# Patient Record
Sex: FEMALE | Race: WHITE | Employment: PART TIME | URBAN - NONMETROPOLITAN AREA
[De-identification: names, ages, dates, MRNs, and addresses within clinical notes are randomized per-mention and may not be internally consistent; named-entity substitution may affect disease eponyms.]

---

## 2022-12-11 ENCOUNTER — HOSPITAL ENCOUNTER (EMERGENCY)
Age: 22
Discharge: HOME | End: 2022-12-11
Attending: EMERGENCY MEDICINE
Payer: COMMERCIAL

## 2022-12-11 ENCOUNTER — APPOINTMENT (OUTPATIENT)
Dept: CT IMAGING | Age: 22
End: 2022-12-11
Payer: COMMERCIAL

## 2022-12-11 VITALS
HEART RATE: 71 BPM | OXYGEN SATURATION: 98 % | SYSTOLIC BLOOD PRESSURE: 153 MMHG | RESPIRATION RATE: 18 BRPM | DIASTOLIC BLOOD PRESSURE: 93 MMHG | TEMPERATURE: 98.2 F

## 2022-12-11 DIAGNOSIS — N39.0 ACUTE UTI (URINARY TRACT INFECTION): Primary | ICD-10-CM

## 2022-12-11 LAB
A/G RATIO: 1.2 (ref 1.1–2.2)
ALBUMIN SERPL-MCNC: 3.7 G/DL (ref 3.4–5)
ALP BLD-CCNC: 54 U/L (ref 40–129)
ALT SERPL-CCNC: 6 U/L (ref 10–40)
ANION GAP SERPL CALCULATED.3IONS-SCNC: 10 MMOL/L (ref 3–16)
AST SERPL-CCNC: 14 U/L (ref 15–37)
BACTERIA: ABNORMAL /HPF
BASOPHILS ABSOLUTE: 0.1 K/UL (ref 0–0.2)
BASOPHILS RELATIVE PERCENT: 0.6 %
BILIRUB SERPL-MCNC: <0.2 MG/DL (ref 0–1)
BILIRUBIN URINE: NEGATIVE
BLOOD, URINE: ABNORMAL
BUN BLDV-MCNC: 15 MG/DL (ref 7–20)
CALCIUM SERPL-MCNC: 9.3 MG/DL (ref 8.3–10.6)
CHLORIDE BLD-SCNC: 106 MMOL/L (ref 99–110)
CLARITY: ABNORMAL
CO2: 25 MMOL/L (ref 21–32)
COLOR: YELLOW
CREAT SERPL-MCNC: 0.7 MG/DL (ref 0.6–1.1)
EOSINOPHILS ABSOLUTE: 0.2 K/UL (ref 0–0.6)
EOSINOPHILS RELATIVE PERCENT: 1.6 %
EPITHELIAL CELLS, UA: ABNORMAL /HPF (ref 0–5)
GFR SERPL CREATININE-BSD FRML MDRD: >60 ML/MIN/{1.73_M2}
GLUCOSE BLD-MCNC: 94 MG/DL (ref 70–99)
GLUCOSE URINE: NEGATIVE MG/DL
HCG(URINE) PREGNANCY TEST: NEGATIVE
HCT VFR BLD CALC: 35.7 % (ref 36–48)
HEMOGLOBIN: 12.1 G/DL (ref 12–16)
KETONES, URINE: NEGATIVE MG/DL
LEUKOCYTE ESTERASE, URINE: ABNORMAL
LYMPHOCYTES ABSOLUTE: 2.5 K/UL (ref 1–5.1)
LYMPHOCYTES RELATIVE PERCENT: 20.2 %
MCH RBC QN AUTO: 26.6 PG (ref 26–34)
MCHC RBC AUTO-ENTMCNC: 33.8 G/DL (ref 31–36)
MCV RBC AUTO: 78.8 FL (ref 80–100)
MICROSCOPIC EXAMINATION: YES
MONOCYTES ABSOLUTE: 0.7 K/UL (ref 0–1.3)
MONOCYTES RELATIVE PERCENT: 5.7 %
NEUTROPHILS ABSOLUTE: 8.9 K/UL (ref 1.7–7.7)
NEUTROPHILS RELATIVE PERCENT: 71.9 %
NITRITE, URINE: NEGATIVE
PDW BLD-RTO: 14.3 % (ref 12.4–15.4)
PH UA: 6 (ref 5–8)
PLATELET # BLD: 266 K/UL (ref 135–450)
PMV BLD AUTO: 7.1 FL (ref 5–10.5)
POTASSIUM REFLEX MAGNESIUM: 3.9 MMOL/L (ref 3.5–5.1)
PROTEIN UA: 100 MG/DL
RBC # BLD: 4.53 M/UL (ref 4–5.2)
RBC UA: ABNORMAL /HPF (ref 0–4)
SODIUM BLD-SCNC: 141 MMOL/L (ref 136–145)
SPECIFIC GRAVITY UA: >=1.03 (ref 1–1.03)
TOTAL PROTEIN: 6.9 G/DL (ref 6.4–8.2)
URINE REFLEX TO CULTURE: YES
URINE TYPE: ABNORMAL
UROBILINOGEN, URINE: 0.2 E.U./DL
WBC # BLD: 12.3 K/UL (ref 4–11)
WBC UA: ABNORMAL /HPF (ref 0–5)

## 2022-12-11 PROCEDURE — 36415 COLL VENOUS BLD VENIPUNCTURE: CPT

## 2022-12-11 PROCEDURE — 85025 COMPLETE CBC W/AUTO DIFF WBC: CPT

## 2022-12-11 PROCEDURE — 87186 SC STD MICRODIL/AGAR DIL: CPT

## 2022-12-11 PROCEDURE — 74177 CT ABD & PELVIS W/CONTRAST: CPT

## 2022-12-11 PROCEDURE — 96374 THER/PROPH/DIAG INJ IV PUSH: CPT

## 2022-12-11 PROCEDURE — 6360000004 HC RX CONTRAST MEDICATION: Performed by: EMERGENCY MEDICINE

## 2022-12-11 PROCEDURE — 96375 TX/PRO/DX INJ NEW DRUG ADDON: CPT

## 2022-12-11 PROCEDURE — 80053 COMPREHEN METABOLIC PANEL: CPT

## 2022-12-11 PROCEDURE — 84703 CHORIONIC GONADOTROPIN ASSAY: CPT

## 2022-12-11 PROCEDURE — 87088 URINE BACTERIA CULTURE: CPT

## 2022-12-11 PROCEDURE — 87086 URINE CULTURE/COLONY COUNT: CPT

## 2022-12-11 PROCEDURE — 99285 EMERGENCY DEPT VISIT HI MDM: CPT

## 2022-12-11 PROCEDURE — 81001 URINALYSIS AUTO W/SCOPE: CPT

## 2022-12-11 PROCEDURE — 6360000002 HC RX W HCPCS: Performed by: EMERGENCY MEDICINE

## 2022-12-11 RX ORDER — CEFUROXIME AXETIL 250 MG/1
250 TABLET ORAL 2 TIMES DAILY
Qty: 20 TABLET | Refills: 0 | Status: SHIPPED | OUTPATIENT
Start: 2022-12-11 | End: 2022-12-21

## 2022-12-11 RX ORDER — PROPRANOLOL HCL 60 MG
120 CAPSULE, EXTENDED RELEASE 24HR ORAL DAILY
COMMUNITY
Start: 2022-09-26 | End: 2022-12-25

## 2022-12-11 RX ORDER — NORGESTIMATE AND ETHINYL ESTRADIOL 0.25-0.035
1 KIT ORAL DAILY
COMMUNITY

## 2022-12-11 RX ORDER — BUPROPION HYDROCHLORIDE 150 MG/1
150 TABLET ORAL DAILY
COMMUNITY
Start: 2022-11-12

## 2022-12-11 RX ORDER — KETOROLAC TROMETHAMINE 30 MG/ML
30 INJECTION, SOLUTION INTRAMUSCULAR; INTRAVENOUS ONCE
Status: COMPLETED | OUTPATIENT
Start: 2022-12-11 | End: 2022-12-11

## 2022-12-11 RX ORDER — LEVOTHYROXINE SODIUM 0.07 MG/1
75 TABLET ORAL
COMMUNITY
Start: 2022-11-08

## 2022-12-11 RX ORDER — ONDANSETRON 2 MG/ML
4 INJECTION INTRAMUSCULAR; INTRAVENOUS
Status: DISCONTINUED | OUTPATIENT
Start: 2022-12-11 | End: 2022-12-11 | Stop reason: HOSPADM

## 2022-12-11 RX ORDER — IBUPROFEN 600 MG/1
600 TABLET ORAL EVERY 6 HOURS PRN
COMMUNITY

## 2022-12-11 RX ORDER — SERTRALINE HYDROCHLORIDE 100 MG/1
200 TABLET, FILM COATED ORAL DAILY
COMMUNITY
Start: 2022-09-06

## 2022-12-11 RX ADMIN — IOPAMIDOL 75 ML: 755 INJECTION, SOLUTION INTRAVENOUS at 08:39

## 2022-12-11 RX ADMIN — ONDANSETRON HYDROCHLORIDE 4 MG: 2 INJECTION, SOLUTION INTRAMUSCULAR; INTRAVENOUS at 09:09

## 2022-12-11 RX ADMIN — KETOROLAC TROMETHAMINE 30 MG: 30 INJECTION, SOLUTION INTRAMUSCULAR; INTRAVENOUS at 09:09

## 2022-12-11 ASSESSMENT — PAIN SCALES - GENERAL
PAINLEVEL_OUTOF10: 7
PAINLEVEL_OUTOF10: 2

## 2022-12-11 ASSESSMENT — PAIN DESCRIPTION - LOCATION: LOCATION: BACK

## 2022-12-11 ASSESSMENT — PAIN DESCRIPTION - ORIENTATION: ORIENTATION: LOWER

## 2022-12-11 ASSESSMENT — PAIN - FUNCTIONAL ASSESSMENT: PAIN_FUNCTIONAL_ASSESSMENT: 0-10

## 2022-12-13 LAB
ORGANISM: ABNORMAL
URINE CULTURE, ROUTINE: ABNORMAL
URINE CULTURE, ROUTINE: ABNORMAL

## 2023-02-05 ENCOUNTER — APPOINTMENT (OUTPATIENT)
Dept: GENERAL RADIOLOGY | Age: 23
End: 2023-02-05
Payer: COMMERCIAL

## 2023-02-05 ENCOUNTER — HOSPITAL ENCOUNTER (EMERGENCY)
Age: 23
Discharge: HOME | End: 2023-02-05
Payer: COMMERCIAL

## 2023-02-05 VITALS
RESPIRATION RATE: 18 BRPM | HEART RATE: 79 BPM | TEMPERATURE: 97.8 F | OXYGEN SATURATION: 98 % | WEIGHT: 293 LBS | DIASTOLIC BLOOD PRESSURE: 87 MMHG | SYSTOLIC BLOOD PRESSURE: 144 MMHG

## 2023-02-05 DIAGNOSIS — S93.401A MODERATE RIGHT ANKLE SPRAIN, INITIAL ENCOUNTER: Primary | ICD-10-CM

## 2023-02-05 DIAGNOSIS — M25.571 ACUTE RIGHT ANKLE PAIN: ICD-10-CM

## 2023-02-05 DIAGNOSIS — R03.0 ELEVATED BLOOD PRESSURE READING: ICD-10-CM

## 2023-02-05 PROCEDURE — 73610 X-RAY EXAM OF ANKLE: CPT

## 2023-02-05 PROCEDURE — 99283 EMERGENCY DEPT VISIT LOW MDM: CPT

## 2023-02-05 PROCEDURE — 6370000000 HC RX 637 (ALT 250 FOR IP): Performed by: PHYSICIAN ASSISTANT

## 2023-02-05 RX ORDER — IBUPROFEN 600 MG/1
600 TABLET ORAL ONCE
Status: COMPLETED | OUTPATIENT
Start: 2023-02-05 | End: 2023-02-05

## 2023-02-05 RX ADMIN — IBUPROFEN 600 MG: 600 TABLET, FILM COATED ORAL at 13:24

## 2023-02-05 ASSESSMENT — PAIN SCALES - GENERAL
PAINLEVEL_OUTOF10: 2
PAINLEVEL_OUTOF10: 2

## 2023-02-05 ASSESSMENT — PAIN DESCRIPTION - ORIENTATION
ORIENTATION: RIGHT
ORIENTATION: RIGHT

## 2023-02-05 ASSESSMENT — PAIN DESCRIPTION - DESCRIPTORS
DESCRIPTORS: ACHING
DESCRIPTORS: ACHING

## 2023-02-05 ASSESSMENT — PAIN DESCRIPTION - LOCATION
LOCATION: ANKLE
LOCATION: ANKLE

## 2023-02-05 ASSESSMENT — PAIN - FUNCTIONAL ASSESSMENT: PAIN_FUNCTIONAL_ASSESSMENT: 0-10

## 2023-02-05 NOTE — ED NOTES
--Patient provided with discharge instructions and any prescriptions. --Instructions, dosing, and follow-up appointments reviewed with patient/family. No further questions or needs at this time. --Vital signs and patient stable upon discharge. --Patient ambulatory to lobby with significant other.       Duane Burnett RN  02/05/23 9887

## 2023-02-05 NOTE — ED NOTES
Pt ambulatory with steady gait and without use of assistive devices.       Jeremy Stoll RN  02/05/23 1400

## 2023-02-05 NOTE — ED NOTES
Air cast and  6 in ace wrap applied to pt R ankle. Pt instructed on use and verbalized understanding.       Clay Baez RN  02/05/23 9414

## 2023-02-05 NOTE — Clinical Note
Hannah Elder was seen and treated in our emergency department on 2/5/2023. She may return to work on 02/07/2023. If you have any questions or concerns, please don't hesitate to call.       Crow Sharp PA-C

## 2023-02-05 NOTE — ED PROVIDER NOTES
[facility]        Pt Name: Ryan Ramires  MRN: [id number]  [place] 2000  Date of evaluation: 2/5/2023  Provider: Fanta Brian PA-C  PCP: Richarda Nyhan, MD  Note Started: 1:49 PM EST 2/5/23      YONI. I have evaluated this patient. My supervising physician was available for consultation. CHIEF COMPLAINT       Chief Complaint   Patient presents with    Ankle Pain     States rolled ankle last night, denies any other injuries        HISTORY OF PRESENT ILLNESS: 1 or more Elements     History From: Patient  Limitations to history : None    Ryan Ramires is a 25 y.o. female who presents to the emergency department for evaluation of right ankle pain and injury that occurred yesterday states she was at the movies and twisted her ankle when she was going down steps. Having pain and swelling to the right lateral ankle. States she also bumped hit her right thigh and has a bruise but only her ankle was bothering her. Did not hit her head. No LOC. Nursing Notes were all reviewed and agreed with or any disagreements were addressed in the HPI. REVIEW OF SYSTEMS :      Review of Systems    Positives and Pertinent negatives as per HPI.      SURGICAL HISTORY     Past Surgical History:   Procedure Laterality Date    WISDOM TOOTH EXTRACTION         CURRENTMEDICATIONS       Previous Medications    BUPROPION (WELLBUTRIN XL) 150 MG EXTENDED RELEASE TABLET    Take 150 mg by mouth daily    IBUPROFEN (ADVIL;MOTRIN) 600 MG TABLET    Take 600 mg by mouth every 6 hours as needed    LEVOTHYROXINE (SYNTHROID) 75 MCG TABLET    Take 75 mcg by mouth every morning (before breakfast)    NORGESTIMATE-ETHINYL ESTRADIOL (ORTHO-CYCLEN) 0.25-35 MG-MCG PER TABLET    Take 1 tablet by mouth daily    PROPRANOLOL (INDERAL LA) 60 MG EXTENDED RELEASE CAPSULE    Take 120 mg by mouth daily    SERTRALINE (ZOLOFT) 100 MG TABLET    Take 200 mg by mouth daily ALLERGIES     Patient has no known allergies. FAMILYHISTORY     History reviewed. No pertinent family history. SOCIAL HISTORY       Social History     Tobacco Use    Smoking status: Never    Smokeless tobacco: Never   Substance Use Topics    Alcohol use: Yes     Comment: occasionally    Drug use: Never       SCREENINGS        Adelaida Coma Scale  Eye Opening: Spontaneous  Best Verbal Response: Oriented  Best Motor Response: Obeys commands  Adelaida Coma Scale Score: 15                CIWA Assessment  BP: (!) 144/87  Heart Rate: 79           PHYSICAL EXAM  1 or more Elements     ED Triage Vitals [02/05/23 1308]   BP Temp Temp Source Heart Rate Resp SpO2 Height Weight   (!) 144/87 97.8 °F (36.6 °C) Oral 79 18 98 % -- 300 lb (136.1 kg)       Physical Exam  Vitals and nursing note reviewed. Constitutional:       Appearance: She is well-developed. She is not ill-appearing or toxic-appearing. HENT:      Head: Normocephalic and atraumatic. Cardiovascular:      Pulses: Normal pulses. Dorsalis pedis pulses are 2+ on the right side. Posterior tibial pulses are 2+ on the right side. Pulmonary:      Effort: Pulmonary effort is normal. No respiratory distress. Musculoskeletal:      Comments: Tenderness and moderate swelling to the right lateral ankle. No open wounds. No deformity. No tenderness at her knee or fifth metatarsal.  Intact sensation. Pedal pulses 2+. Cap refill less than 2 seconds. Patient is wiggling her toes and flexing extending her foot. Skin:     General: Skin is warm and dry. Capillary Refill: Capillary refill takes less than 2 seconds. Neurological:      Mental Status: She is alert and oriented to person, place, and time. GCS: GCS eye subscore is 4. GCS verbal subscore is 5. GCS motor subscore is 6. Sensory: Sensation is intact. Motor: Motor function is intact. No abnormal muscle tone.    Psychiatric:         Behavior: Behavior normal. DIAGNOSTIC RESULTS   LABS:    Labs Reviewed - No data to display    When ordered only abnormal lab results are displayed. All other labs were within normal range or not returned as of this dictation. EKG: When ordered, EKG's are interpreted by the Emergency Department Physician in the absence of a cardiologist.  Please see their note for interpretation of EKG. RADIOLOGY:   Non-plain film images such as CT, Ultrasound and MRI are read by the radiologist. Plain radiographic images are visualized and preliminarily interpreted by the ED Provider with the below findings:    Preliminary x-ray interpretation by myself  independently, in absence of radiologist (Final interpretation by radiologist to follow):      Right ankle: Soft tissue swelling noted laterally. No fracture seen. Interpretation per the Radiologist below, if available at the time of this note:    XR ANKLE RIGHT (MIN 3 VIEWS)   Final Result   No acute abnormality of the ankle. XR ANKLE RIGHT (MIN 3 VIEWS)    Result Date: 2/5/2023  EXAMINATION: THREE XRAY VIEWS OF THE RIGHT ANKLE 2/5/2023 1:24 pm COMPARISON: None. HISTORY: ORDERING SYSTEM PROVIDED HISTORY: injury r/o fx TECHNOLOGIST PROVIDED HISTORY: Reason for exam:->injury r/o fx Reason for Exam: Rolled ankle FINDINGS: No evidence of acute fracture or dislocation. Normal alignment of the ankle mortise. No focal osseous lesion. No evidence of joint effusion. No focal soft tissue abnormality. No acute abnormality of the ankle. No results found. PROCEDURES   Unless otherwise noted below, none     Procedures    CRITICAL CARE TIME (.cctime)   0    PAST MEDICAL HISTORY      has a past medical history of Anxiety, Depression, Hypothyroidism, Migraine, and Overactive bladder.      EMERGENCY DEPARTMENT COURSE and DIFFERENTIAL DIAGNOSIS/MDM:   Vitals:    Vitals:    02/05/23 1308   BP: (!) 144/87   Pulse: 79   Resp: 18   Temp: 97.8 °F (36.6 °C)   TempSrc: Oral   SpO2: 98% Weight: 300 lb (136.1 kg)       Patient was given the following medications:  Medications   ibuprofen (ADVIL;MOTRIN) tablet 600 mg (600 mg Oral Given 2/5/23 1324)             Is this patient to be included in the SEP-1 Core Measure due to severe sepsis or septic shock? No   Exclusion criteria - the patient is NOT to be included for SEP-1 Core Measure due to: Infection is not suspected    Chronic Conditions affecting care:    has a past medical history of Anxiety, Depression, Hypothyroidism, Migraine, and Overactive bladder. CONSULTS: (Who and What was discussed)  None          Records Reviewed (External and Source)     CC/HPI Summary, DDx, ED Course, and Reassessment:     Patient presented to the ER for evaluation of right ankle pain and injury. On exam she has tenderness moderate swelling to the right lateral ankle. She is neurovascular intact. X-rays obtained and reviewed by myself show soft tissue swelling no fracture seen. I did discuss with her limitations of plain film x-rays and need for follow-up if her symptoms or not improving she understands. Aircast will be applied. Advise rest ice elevate Tylenol and ibuprofen for pain. I estimate there is LOW risk for COMPARTMENT SYNDROME OR NEUROVASCULAR INJURY, thus I consider the discharge disposition reasonable. I am the Primary Clinician of Record. FINAL IMPRESSION      1. Moderate right ankle sprain, initial encounter    2. Acute right ankle pain    3.  Elevated blood pressure reading          DISPOSITION/PLAN     DISPOSITION Decision to Discharge    PATIENT REFERRED TO:  MD [address]  [address] [place] [address]  [phone]    In 1 week      DISCHARGE MEDICATIONS:  New Prescriptions    No medications on file       DISCONTINUED MEDICATIONS:  Discontinued Medications    No medications on file              (Please note that portions of this note were completed with a voice recognition program.  Efforts were made to edit the dictations but occasionally words are mis-transcribed.)    Frankie Kamara PA-C (electronically signed)           Melina Jerome PA-C  02/05/23 0700

## 2025-02-14 ENCOUNTER — OFFICE VISIT (OUTPATIENT)
Dept: PRIMARY CARE CLINIC | Age: 25
End: 2025-02-14
Payer: COMMERCIAL

## 2025-02-14 VITALS
OXYGEN SATURATION: 97 % | SYSTOLIC BLOOD PRESSURE: 120 MMHG | WEIGHT: 293 LBS | DIASTOLIC BLOOD PRESSURE: 72 MMHG | TEMPERATURE: 97.6 F | HEIGHT: 63 IN | BODY MASS INDEX: 51.91 KG/M2 | HEART RATE: 62 BPM

## 2025-02-14 DIAGNOSIS — R07.81 RIB PAIN ON RIGHT SIDE: ICD-10-CM

## 2025-02-14 DIAGNOSIS — R22.2 MASS IN CHEST: ICD-10-CM

## 2025-02-14 DIAGNOSIS — Z76.89 ENCOUNTER TO ESTABLISH CARE: Primary | ICD-10-CM

## 2025-02-14 PROCEDURE — 99203 OFFICE O/P NEW LOW 30 MIN: CPT

## 2025-02-14 RX ORDER — SOLIFENACIN SUCCINATE 5 MG/1
TABLET, FILM COATED ORAL
COMMUNITY
Start: 2024-11-17

## 2025-02-14 RX ORDER — TRAMADOL HYDROCHLORIDE 50 MG/1
50 TABLET ORAL EVERY 8 HOURS PRN
Qty: 21 TABLET | Refills: 0 | Status: SHIPPED | OUTPATIENT
Start: 2025-02-14 | End: 2025-02-21

## 2025-02-14 RX ORDER — METHOCARBAMOL 750 MG/1
750 TABLET, FILM COATED ORAL 3 TIMES DAILY
Qty: 90 TABLET | Refills: 1 | Status: SHIPPED | OUTPATIENT
Start: 2025-02-14 | End: 2025-04-15

## 2025-02-14 SDOH — ECONOMIC STABILITY: FOOD INSECURITY: WITHIN THE PAST 12 MONTHS, THE FOOD YOU BOUGHT JUST DIDN'T LAST AND YOU DIDN'T HAVE MONEY TO GET MORE.: NEVER TRUE

## 2025-02-14 SDOH — ECONOMIC STABILITY: FOOD INSECURITY: WITHIN THE PAST 12 MONTHS, YOU WORRIED THAT YOUR FOOD WOULD RUN OUT BEFORE YOU GOT MONEY TO BUY MORE.: NEVER TRUE

## 2025-02-14 ASSESSMENT — ENCOUNTER SYMPTOMS
EYES NEGATIVE: 1
SORE THROAT: 0
STRIDOR: 0
COUGH: 0
CHEST TIGHTNESS: 0
SINUS PRESSURE: 0
SHORTNESS OF BREATH: 0
TROUBLE SWALLOWING: 0
ALLERGIC/IMMUNOLOGIC NEGATIVE: 1
VOMITING: 0
NAUSEA: 0
CONSTIPATION: 0
ABDOMINAL DISTENTION: 0
RHINORRHEA: 0
WHEEZING: 0
DIARRHEA: 0
ABDOMINAL PAIN: 0

## 2025-02-14 ASSESSMENT — PATIENT HEALTH QUESTIONNAIRE - PHQ9
SUM OF ALL RESPONSES TO PHQ QUESTIONS 1-9: 0
SUM OF ALL RESPONSES TO PHQ9 QUESTIONS 1 & 2: 0
SUM OF ALL RESPONSES TO PHQ QUESTIONS 1-9: 0
1. LITTLE INTEREST OR PLEASURE IN DOING THINGS: NOT AT ALL
SUM OF ALL RESPONSES TO PHQ QUESTIONS 1-9: 0
2. FEELING DOWN, DEPRESSED OR HOPELESS: NOT AT ALL
2. FEELING DOWN, DEPRESSED OR HOPELESS: NOT AT ALL
SUM OF ALL RESPONSES TO PHQ9 QUESTIONS 1 & 2: 0
1. LITTLE INTEREST OR PLEASURE IN DOING THINGS: NOT AT ALL
SUM OF ALL RESPONSES TO PHQ QUESTIONS 1-9: 0

## 2025-02-14 NOTE — PROGRESS NOTES
[facility]  Establish care visit   2025    Jesusita Alberts (:  2000) is a 24 y.o. female, here to establish care.    Chief Complaint   Patient presents with    New Patient     [place]        ASSESSMENT/ PLAN  Assessment & Plan  Encounter to establish care    Here to establish with provider. Records will be obtained, consent signes.          Rib pain on right side   Acute condition, new, Supportive care with appropriate antipyretics and fluids.  Educational material distributed and questions answered.  Right pleural nodule with associated trace right pleural effusion. This is nonspecific but could be seen with an intercostal nerve schwannoma or solitary fibrous tumor of the pleura. Associated heterogeneity of the right lateral sixth rib. Consider nonemergent CT of the chest, PET/CT and/or tissue sampling for further evaluation.   Orders:    methocarbamol (ROBAXIN-750) 750 MG tablet; Take 1 tablet by mouth 3 times daily    traMADol (ULTRAM) 50 MG tablet; Take 1 tablet by mouth every 8 hours as needed for Pain for up to 7 days. Intended supply: 7 days. Take lowest dose possible to manage pain Max Daily Amount: 150 mg    Mass in chest   Acute condition, new, Supportive care with appropriate antipyretics and fluids.  Keep appt with cardio-thoracis surgeon.               Return in about 3 months (around 2025).    HPI  Patient presents to establish care with provider. Previous PCP was myself at prior office.  -Cc:establishing care with provider at new office.   -HPI: Patient presents today because she was in the emergency room on  with right side pain and then again return to the emergency room on  with continued right-sided pain that got worse.  Was seen by cardiothoracic surgeon on the .  Has ordered PET scan and biopsy of nodule that was seen on original CT scan.  Patient was given Robaxin from ER for 3 days that was helpful for her right-sided rib

## 2025-02-19 ENCOUNTER — PATIENT MESSAGE (OUTPATIENT)
Dept: PRIMARY CARE CLINIC | Age: 25
End: 2025-02-19

## 2025-02-19 RX ORDER — NORGESTIMATE AND ETHINYL ESTRADIOL 0.25-0.035
1 KIT ORAL DAILY
Qty: 1 PACKET | Status: CANCELLED | OUTPATIENT
Start: 2025-02-19

## 2025-02-20 DIAGNOSIS — Z30.41 ENCOUNTER FOR SURVEILLANCE OF CONTRACEPTIVE PILLS: Primary | ICD-10-CM

## 2025-02-20 RX ORDER — NORGESTIMATE AND ETHINYL ESTRADIOL 0.25-0.035
1 KIT ORAL DAILY
Qty: 3 PACKET | Refills: 3 | Status: SHIPPED | OUTPATIENT
Start: 2025-02-20

## 2025-03-13 ENCOUNTER — TELEPHONE (OUTPATIENT)
Dept: PRIMARY CARE CLINIC | Age: 25
End: 2025-03-13

## 2025-03-13 ENCOUNTER — TELEMEDICINE (OUTPATIENT)
Dept: PRIMARY CARE CLINIC | Age: 25
End: 2025-03-13
Payer: COMMERCIAL

## 2025-03-13 DIAGNOSIS — R06.2 WHEEZING: ICD-10-CM

## 2025-03-13 DIAGNOSIS — R05.1 ACUTE COUGH: ICD-10-CM

## 2025-03-13 DIAGNOSIS — J40 BRONCHITIS: Primary | ICD-10-CM

## 2025-03-13 PROCEDURE — 99213 OFFICE O/P EST LOW 20 MIN: CPT

## 2025-03-13 RX ORDER — ALBUTEROL SULFATE 90 UG/1
2 INHALANT RESPIRATORY (INHALATION) 4 TIMES DAILY PRN
Qty: 18 G | Refills: 0 | Status: SHIPPED | OUTPATIENT
Start: 2025-03-13

## 2025-03-13 RX ORDER — BROMPHENIRAMINE MALEATE, PSEUDOEPHEDRINE HYDROCHLORIDE, AND DEXTROMETHORPHAN HYDROBROMIDE 2; 30; 10 MG/5ML; MG/5ML; MG/5ML
5 SYRUP ORAL 4 TIMES DAILY PRN
Qty: 118 ML | Refills: 0 | Status: SHIPPED | OUTPATIENT
Start: 2025-03-13

## 2025-03-13 RX ORDER — PREDNISONE 20 MG/1
20 TABLET ORAL 2 TIMES DAILY
Qty: 10 TABLET | Refills: 0 | Status: SHIPPED | OUTPATIENT
Start: 2025-03-13 | End: 2025-03-18

## 2025-03-13 ASSESSMENT — ENCOUNTER SYMPTOMS
CONSTIPATION: 0
TROUBLE SWALLOWING: 0
DIARRHEA: 0
CHEST TIGHTNESS: 0
COUGH: 1
SHORTNESS OF BREATH: 1
STRIDOR: 0
SINUS PRESSURE: 0
EYES NEGATIVE: 1
SORE THROAT: 1
ABDOMINAL PAIN: 0
ABDOMINAL DISTENTION: 0
WHEEZING: 0
RHINORRHEA: 0
ALLERGIC/IMMUNOLOGIC NEGATIVE: 1
VOMITING: 0
NAUSEA: 0

## 2025-03-13 NOTE — PROGRESS NOTES
[x] Oriented to person/place/time [x] Able to follow commands    [] Abnormal -     Eyes:   EOM    [x]  Normal    [] Abnormal -   Sclera  [x]  Normal    [] Abnormal -          Discharge [x]  None visible   [] Abnormal -     HENT: [x] Normocephalic, atraumatic  [] Abnormal -   [x] Mouth/Throat: Mucous membranes are moist    External Ears [x] Normal  [] Abnormal -    Neck: [x] No visualized mass [] Abnormal -     Pulmonary/Chest: [x] Respiratory effort normal   [x] No visualized signs of difficulty breathing or respiratory distress        [] Abnormal -      Musculoskeletal:   [x] Normal gait with no signs of ataxia         [x] Normal range of motion of neck        [] Abnormal -     Neurological:        [x] No Facial Asymmetry (Cranial nerve 7 motor function) (limited exam due to video visit)          [x] No gaze palsy        [] Abnormal -          Skin:        [x] No significant exanthematous lesions or discoloration noted on facial skin         [] Abnormal -            Psychiatric:       [x] Normal Affect [] Abnormal -        [x] No Hallucinations    Other pertinent observable physical exam findings:-         On this date 3/13/2025 I have spent 8 minutes reviewing previous notes, test results and face to face (virtual) with the patient discussing the diagnosis and importance of compliance with the treatment plan as well as documenting on the day of the visit.    --Joycelyn Dong-ENRIQUE Casarez - CNP

## 2025-03-13 NOTE — TELEPHONE ENCOUNTER
Grandmother called and wanted to talk to you about her granddaughter Jesusita. She said that she had tests done at [facility] and was told she has cancer. They did a CT Scan and the a PET.     She wants to talk to you but the patient has no one her HIPAA.     I explained that to her. She wants you to call her back and explain everything to her and possible the mother of the patient.

## 2025-03-14 NOTE — TELEPHONE ENCOUNTER
Called and spoke with Jesusita, she is gong to fill the family in as she requests throughout the process. No need to return call to family at this time.

## 2025-04-01 PROBLEM — G43.909 MIGRAINES: Status: RESOLVED | Noted: 2025-04-01 | Resolved: 2025-04-01

## 2025-04-01 PROBLEM — J94.8 PLEURAL MASS: Status: ACTIVE | Noted: 2025-02-12

## 2025-04-01 PROBLEM — F32.A CHRONIC DEPRESSION: Status: ACTIVE | Noted: 2023-08-10

## 2025-04-01 PROBLEM — G43.109 MIGRAINE WITH AURA AND WITHOUT STATUS MIGRAINOSUS, NOT INTRACTABLE: Status: ACTIVE | Noted: 2025-04-01

## 2025-04-01 PROBLEM — R35.0 URINARY FREQUENCY: Status: RESOLVED | Noted: 2020-02-03 | Resolved: 2025-04-01

## 2025-04-01 PROBLEM — G25.81 RLS (RESTLESS LEGS SYNDROME): Status: ACTIVE | Noted: 2023-08-10

## 2025-04-01 PROBLEM — N32.81 OVERACTIVE BLADDER: Status: ACTIVE | Noted: 2020-02-03

## 2025-04-01 PROBLEM — F41.9 ANXIETY: Status: ACTIVE | Noted: 2025-04-01

## 2025-04-01 PROBLEM — J02.9 PHARYNGITIS: Status: RESOLVED | Noted: 2018-04-06 | Resolved: 2025-04-01

## 2025-04-01 PROBLEM — J31.0 RHINITIS, CHRONIC: Status: RESOLVED | Noted: 2023-08-10 | Resolved: 2025-04-01

## 2025-04-01 PROBLEM — E03.8 SUBCLINICAL HYPOTHYROIDISM: Status: ACTIVE | Noted: 2021-04-02

## 2025-04-01 PROBLEM — U07.1 COVID-19: Status: RESOLVED | Noted: 2022-01-06 | Resolved: 2025-04-01

## 2025-04-01 PROBLEM — C41.9 EWING SARCOMA (HCC): Status: ACTIVE | Noted: 2025-04-01

## 2025-04-01 PROBLEM — E66.01 MORBID OBESITY WITH BMI OF 50.0-59.9, ADULT: Status: ACTIVE | Noted: 2023-08-10

## 2025-04-04 ENCOUNTER — TELEMEDICINE (OUTPATIENT)
Dept: PRIMARY CARE CLINIC | Age: 25
End: 2025-04-04
Payer: COMMERCIAL

## 2025-04-04 DIAGNOSIS — Z30.41 ENCOUNTER FOR SURVEILLANCE OF CONTRACEPTIVE PILLS: ICD-10-CM

## 2025-04-04 DIAGNOSIS — C41.9 EWING SARCOMA (HCC): ICD-10-CM

## 2025-04-04 DIAGNOSIS — M79.2 NERVE PAIN: Primary | ICD-10-CM

## 2025-04-04 PROCEDURE — 99213 OFFICE O/P EST LOW 20 MIN: CPT

## 2025-04-04 RX ORDER — TRAMADOL HYDROCHLORIDE 50 MG/1
50 TABLET ORAL EVERY 6 HOURS PRN
COMMUNITY
End: 2025-04-04

## 2025-04-04 RX ORDER — NORGESTIMATE AND ETHINYL ESTRADIOL 0.25-0.035
1 KIT ORAL DAILY
Qty: 3 PACKET | Refills: 3 | Status: SHIPPED | OUTPATIENT
Start: 2025-04-04

## 2025-04-04 RX ORDER — TRAMADOL HYDROCHLORIDE 50 MG/1
100 TABLET ORAL EVERY 6 HOURS PRN
Qty: 42 TABLET | Refills: 0 | Status: SHIPPED | OUTPATIENT
Start: 2025-04-04 | End: 2025-05-04

## 2025-04-04 RX ORDER — GABAPENTIN 100 MG/1
100 CAPSULE ORAL 3 TIMES DAILY
Qty: 90 CAPSULE | Refills: 1 | Status: SHIPPED | OUTPATIENT
Start: 2025-04-04 | End: 2025-06-03

## 2025-04-04 ASSESSMENT — ENCOUNTER SYMPTOMS
DIARRHEA: 0
RHINORRHEA: 0
VOMITING: 0
STRIDOR: 0
CONSTIPATION: 0
SWOLLEN GLANDS: 0
NAUSEA: 0
VISUAL CHANGE: 0
ABDOMINAL DISTENTION: 0
EYES NEGATIVE: 1
SORE THROAT: 0
ABDOMINAL PAIN: 0
CHEST TIGHTNESS: 0
CHANGE IN BOWEL HABIT: 0
ALLERGIC/IMMUNOLOGIC NEGATIVE: 1
SHORTNESS OF BREATH: 0
COUGH: 0
TROUBLE SWALLOWING: 0
SINUS PRESSURE: 0
WHEEZING: 0

## 2025-04-04 NOTE — ASSESSMENT & PLAN NOTE
Orders:    traMADol (ULTRAM) 50 MG tablet; Take 2 tablets by mouth every 6 hours as needed for Pain for up to 30 days. Take lowest dose possible to manage pain Max Daily Amount: 400 mg    gabapentin (NEURONTIN) 100 MG capsule; Take 1 capsule by mouth 3 times daily for 60 days. Intended supply: 30 days

## 2025-04-04 NOTE — PROGRESS NOTES
Jesusita Alberts, was evaluated through a synchronous (real-time) audio-video encounter. The patient (or guardian if applicable) is aware that this is a billable service, which includes applicable co-pays. This Virtual Visit was conducted with patient's (and/or legal guardian's) consent. Patient identification was verified, and a caregiver was present when appropriate.   The patient was located at Home: [address]  [place] [address]  Provider was located at Office (Appt Dept State): [place]  Confirm you are appropriately licensed, registered, or certified to deliver care in the state where the patient is located as indicated above. If you are not or unsure, please re-schedule the visit: Yes, I confirm.     Jesusita Alberts (:  2000) is a Established patient, presenting virtually for evaluation of the following:New Gordon Sarcoma dx, pain control until established with oncology, birth control refill.       Below is the assessment and plan developed based on review of pertinent history, physical exam, labs, studies, and medications.     Assessment & Plan  Encounter for surveillance of contraceptive pills       Orders:    norgestimate-ethinyl estradiol (ORTHO-CYCLEN) 0.25-35 MG-MCG per tablet; Take 1 tablet by mouth daily    Nerve pain       Orders:    gabapentin (NEURONTIN) 100 MG capsule; Take 1 capsule by mouth 3 times daily for 60 days. Intended supply: 30 days    Gordon sarcoma (HCC)       Orders:    traMADol (ULTRAM) 50 MG tablet; Take 2 tablets by mouth every 6 hours as needed for Pain for up to 30 days. Take lowest dose possible to manage pain Max Daily Amount: 400 mg    gabapentin (NEURONTIN) 100 MG capsule; Take 1 capsule by mouth 3 times daily for 60 days. Intended supply: 30 days      No follow-ups on file.       Subjective   Chief Complaint:  The patient presents with a chief complaint of pain in her rt side, shoulder, and buttock radiating down to her knee, accompanied by leg twitching. The pain

## 2025-04-04 NOTE — PATIENT INSTRUCTIONS
Follow up as needed and as discussed in office.    any medications called in to pharmacy, if any concerns please call our office or send MyChart message so these can be resolved promptly.  If labs were obtained today-the usual turnaround time for results is 24-72 business hours. If you have not heard from office by then please call.   If referrals were sent, please call to schedule if you do not hear from specialist within 48 hours.    -oncology to take over pain management once she gets established with them.

## 2025-04-04 NOTE — FLOWSHEET NOTE
New dx Ewings Sarcoma-having bone and nerve pain not controlled with 50mg of ultram. Oncology to take over pain management once she establishes there.